# Patient Record
Sex: MALE | URBAN - METROPOLITAN AREA
[De-identification: names, ages, dates, MRNs, and addresses within clinical notes are randomized per-mention and may not be internally consistent; named-entity substitution may affect disease eponyms.]

---

## 2018-04-27 ENCOUNTER — NURSE TRIAGE (OUTPATIENT)
Dept: NURSING | Facility: CLINIC | Age: 53
End: 2018-04-27

## 2018-04-27 NOTE — TELEPHONE ENCOUNTER
Patient was seen at clinic today by Dr Boggs. He reports that Dr Boggs was going to order cyclobenzaprine and prednisone for his back pain. When he got to the pharmacy, they only had the prednisone prescription. He said he really needs the cyclobenzaprine for his pain.     6:10PM This nurse called Dr Patel, on call for [facility], on her cell phone. The situation was discussed. Dr Patel said she will order 6 tablets of cyclobenzaprine for the patient. The patient should call the clinic on Monday, 4-30-18, about getting more cyclobenzaprine.     6:12PM This nurse called the patient back and he was given the above information. He indicated that he understood.     Estella Orozco RN/FNA

## 2021-10-26 ENCOUNTER — TRANSFERRED RECORDS (OUTPATIENT)
Dept: HEALTH INFORMATION MANAGEMENT | Facility: CLINIC | Age: 56
End: 2021-10-26

## 2021-10-26 LAB — RETINOPATHY: NEGATIVE
